# Patient Record
Sex: FEMALE | URBAN - METROPOLITAN AREA
[De-identification: names, ages, dates, MRNs, and addresses within clinical notes are randomized per-mention and may not be internally consistent; named-entity substitution may affect disease eponyms.]

---

## 2018-07-10 ENCOUNTER — OFFICE VISIT (OUTPATIENT)
Dept: URBAN - METROPOLITAN AREA CLINIC 33 | Facility: CLINIC | Age: 73
End: 2018-07-10
Payer: COMMERCIAL

## 2018-07-10 DIAGNOSIS — H25.13 AGE-RELATED NUCLEAR CATARACT, BILATERAL: ICD-10-CM

## 2018-07-10 DIAGNOSIS — H43.812 VITREOUS DEGENERATION, LEFT EYE: Primary | ICD-10-CM

## 2018-07-10 PROCEDURE — 99203 OFFICE O/P NEW LOW 30 MIN: CPT | Performed by: OPTOMETRIST

## 2018-07-10 ASSESSMENT — KERATOMETRY
OS: 46.50
OD: 46.13

## 2018-07-10 ASSESSMENT — INTRAOCULAR PRESSURE
OD: 15
OS: 15

## 2018-07-10 ASSESSMENT — VISUAL ACUITY
OS: 20/20
OD: 20/20

## 2018-07-10 NOTE — IMPRESSION/PLAN
Impression: Vitreous degeneration, left eye: H43.812. Plan: Posterior vitreous detachment accounts for the patient's complaints. There is no evidence of retinal pathology. All signs and risks of retinal detachment and tears were discussed in detail. Patient instructed to call the office immediately if any symptoms noted. Recommend the patient return to office for follow up w/ dilated examination.

## 2018-08-14 ENCOUNTER — OFFICE VISIT (OUTPATIENT)
Dept: URBAN - METROPOLITAN AREA CLINIC 33 | Facility: CLINIC | Age: 73
End: 2018-08-14
Payer: COMMERCIAL

## 2018-08-14 PROCEDURE — 92015 DETERMINE REFRACTIVE STATE: CPT | Performed by: OPTOMETRIST

## 2018-08-14 PROCEDURE — 92012 INTRM OPH EXAM EST PATIENT: CPT | Performed by: OPTOMETRIST

## 2018-08-14 ASSESSMENT — INTRAOCULAR PRESSURE
OS: 13
OD: 13

## 2018-08-14 ASSESSMENT — VISUAL ACUITY
OS: 20/20
OD: 20/20

## 2018-08-14 NOTE — IMPRESSION/PLAN
Impression: Myopia, bilateral: H52.13. Plan: Educated patient about today's findings. Order refraction to determine patient's best corrected visual acuity as it relates to myopia- dispensed Rx to patient. Patient was educated about 1-2 week adaptation period with new Rx.  Pt was given CL trials OS only (monovision)

## 2018-08-21 ENCOUNTER — TESTING ONLY (OUTPATIENT)
Dept: URBAN - METROPOLITAN AREA CLINIC 33 | Facility: CLINIC | Age: 73
End: 2018-08-21

## 2018-08-21 DIAGNOSIS — H52.13 MYOPIA, BILATERAL: Primary | ICD-10-CM

## 2018-08-21 PROCEDURE — 92310 CONTACT LENS FITTING OU: CPT | Performed by: OPTOMETRIST

## 2019-08-06 ENCOUNTER — OFFICE VISIT (OUTPATIENT)
Dept: URBAN - METROPOLITAN AREA CLINIC 33 | Facility: CLINIC | Age: 74
End: 2019-08-06
Payer: COMMERCIAL

## 2019-08-06 PROCEDURE — 99214 OFFICE O/P EST MOD 30 MIN: CPT | Performed by: OPTOMETRIST

## 2019-08-06 ASSESSMENT — INTRAOCULAR PRESSURE
OS: 13
OD: 14

## 2019-08-06 ASSESSMENT — VISUAL ACUITY
OS: 20/30
OD: 20/25

## 2019-08-06 ASSESSMENT — KERATOMETRY
OD: 46.38
OS: 46.63